# Patient Record
(demographics unavailable — no encounter records)

---

## 2025-02-21 NOTE — ASSESSMENT
[FreeTextEntry1] : 25M with varicocele hematospermia after groin injury during wrestling. His scrotal pain and hematospermia have completely resolved since then. No voiding symptoms. No issues with erections or ejaculation.  #hematospermia -resolved -can return PRN  #Varicocele - scrotal US reviewed and interpreted. As noted above in HPI - No surgical intervention indicated at this time for his epididymal cyst and varicocele at this time.  -can f/u in the future if there are concerns with fertility  We discussed the reasons to intervene for varicoceles: Pain, impaired cosmesis, infertility. He is concerned about the future fertility implications. We discussed relationship between varicoceles and infertility. Discussed that varicoceles are common, and are present in approximately 15% of men, however approximately 40% of men who presents for infertility reasons are found to have a varicocele. We discussed that men with impaired semen parameters, impaired fertility, and a varicocele may benefit from varicocelectomy. We also discussed that because varicoceles are common, the vast majority of men with varicoceles have no problems with fertility. We discussed observation versus further work up with semen analysis, the risk and benefits of each.

## 2025-02-21 NOTE — PHYSICAL EXAM
[General Appearance - Well Developed] : well developed [General Appearance - Well Nourished] : well nourished [Normal Appearance] : normal appearance [Well Groomed] : well groomed [] : no respiratory distress [Exaggerated Use Of Accessory Muscles For Inspiration] : no accessory muscle use [Oriented To Time, Place, And Person] : oriented to person, place, and time [Affect] : the affect was normal [Chaperone Present] : A chaperone was present in the examining room during all aspects of the physical examination [FreeTextEntry2] : Rashawn Avery - resident

## 2025-02-21 NOTE — HISTORY OF PRESENT ILLNESS
[FreeTextEntry1] : ANDRE LOMBARDI is a 25-year M w/ a pmhx presents for evaluation after hematospermia last week. This occurred after he got injured wrestling with his sibling. He currently has residual groin pain, but this has been improving. Had one episode of hematospermia that has since resolved. No painful ejaculation or erections. No dysuria or changes in his urinary stream or hematuria. No testicular pain.   No hx of UTIs. Sexually active  Labs Lipid Panel - 210 total cholesterol PSA 0.59 Vitamin D 27  Imaging Bladder US - PVR is 13, prostate 4x3.7x2.5 (wnl)  Scrotal US  Small left varicocele measuring 3mm, Right 0.3cm epididymal cyst/spermatocele